# Patient Record
Sex: FEMALE | URBAN - METROPOLITAN AREA
[De-identification: names, ages, dates, MRNs, and addresses within clinical notes are randomized per-mention and may not be internally consistent; named-entity substitution may affect disease eponyms.]

---

## 2019-12-19 ENCOUNTER — APPOINTMENT (OUTPATIENT)
Age: 39
Setting detail: DERMATOLOGY
End: 2019-12-20

## 2019-12-19 VITALS — WEIGHT: 140 LBS | HEIGHT: 61 IN

## 2019-12-19 DIAGNOSIS — L65.8 OTHER SPECIFIED NONSCARRING HAIR LOSS: ICD-10-CM

## 2019-12-19 PROCEDURE — OTHER MIPS QUALITY: OTHER

## 2019-12-19 PROCEDURE — OTHER FOLLOW UP FOR NEXT VISIT: OTHER

## 2019-12-19 PROCEDURE — OTHER TREATMENT REGIMEN: OTHER

## 2019-12-19 PROCEDURE — OTHER COUNSELING: OTHER

## 2019-12-19 PROCEDURE — 99202 OFFICE O/P NEW SF 15 MIN: CPT

## 2019-12-19 ASSESSMENT — LOCATION DETAILED DESCRIPTION DERM: LOCATION DETAILED: MID-FRONTAL SCALP

## 2019-12-19 ASSESSMENT — LOCATION SIMPLE DESCRIPTION DERM: LOCATION SIMPLE: ANTERIOR SCALP

## 2019-12-19 ASSESSMENT — LOCATION ZONE DERM: LOCATION ZONE: SCALP

## 2019-12-19 NOTE — HPI: HAIR LOSS
Previous Labs: Yes
How Did The Hair Loss Occur?: gradual in onset
How Severe Is Your Hair Loss?: mild
Additional History: Patient comes in complaining of gradual air loss over the past one year. There are no specific symptoms, no itching, no burning, no irritation. She has not had any major recent illnesses, no major recent surgeries. She has tried taking over-the-counter biotin with little to no benefit. She’s actually not sure why she’s here but was told to come here by her primary care physician. She did have recent lab work with her primary care physician which was reportedly normal. No recent hormonal changes, no changes in her menstrual cycle

## 2019-12-19 NOTE — PROCEDURE: TREATMENT REGIMEN
Plan: At this point there are no signs of inflammation or irritation. This could be a genetic or androgenetic component to hair loss. Patient will use the over-the-counter Rogaine 5% as directed for 3 to 4 months. If she does have any progression or any problems she can follow up for a possible scalp biopsy
Detail Level: Zone